# Patient Record
Sex: FEMALE | Race: OTHER | URBAN - METROPOLITAN AREA
[De-identification: names, ages, dates, MRNs, and addresses within clinical notes are randomized per-mention and may not be internally consistent; named-entity substitution may affect disease eponyms.]

---

## 2020-12-17 ENCOUNTER — EMERGENCY (EMERGENCY)
Facility: HOSPITAL | Age: 25
LOS: 1 days | Discharge: HOME | End: 2020-12-17
Attending: EMERGENCY MEDICINE | Admitting: EMERGENCY MEDICINE
Payer: COMMERCIAL

## 2020-12-17 VITALS
HEIGHT: 63 IN | WEIGHT: 130.07 LBS | TEMPERATURE: 98 F | OXYGEN SATURATION: 96 % | HEART RATE: 79 BPM | SYSTOLIC BLOOD PRESSURE: 113 MMHG | DIASTOLIC BLOOD PRESSURE: 79 MMHG | RESPIRATION RATE: 18 BRPM

## 2020-12-17 DIAGNOSIS — U07.1 COVID-19: ICD-10-CM

## 2020-12-17 DIAGNOSIS — R53.81 OTHER MALAISE: ICD-10-CM

## 2020-12-17 LAB
ALBUMIN SERPL ELPH-MCNC: 4.4 G/DL — SIGNIFICANT CHANGE UP (ref 3.3–5)
ALP SERPL-CCNC: 41 U/L — SIGNIFICANT CHANGE UP (ref 40–120)
ALT FLD-CCNC: 28 U/L — SIGNIFICANT CHANGE UP (ref 10–45)
ANION GAP SERPL CALC-SCNC: 12 MMOL/L — SIGNIFICANT CHANGE UP (ref 5–17)
APTT BLD: 43.3 SEC — HIGH (ref 27.5–35.5)
AST SERPL-CCNC: 47 U/L — HIGH (ref 10–40)
BASE EXCESS BLDV CALC-SCNC: -0.1 MMOL/L — SIGNIFICANT CHANGE UP
BASOPHILS # BLD AUTO: 0.01 K/UL — SIGNIFICANT CHANGE UP (ref 0–0.2)
BASOPHILS NFR BLD AUTO: 0.3 % — SIGNIFICANT CHANGE UP (ref 0–2)
BILIRUB SERPL-MCNC: 0.2 MG/DL — SIGNIFICANT CHANGE UP (ref 0.2–1.2)
BUN SERPL-MCNC: 12 MG/DL — SIGNIFICANT CHANGE UP (ref 7–23)
CA-I SERPL-SCNC: 1.13 MMOL/L — SIGNIFICANT CHANGE UP (ref 1.12–1.3)
CALCIUM SERPL-MCNC: 9 MG/DL — SIGNIFICANT CHANGE UP (ref 8.4–10.5)
CHLORIDE SERPL-SCNC: 102 MMOL/L — SIGNIFICANT CHANGE UP (ref 96–108)
CK SERPL-CCNC: 840 U/L — HIGH (ref 25–170)
CO2 SERPL-SCNC: 25 MMOL/L — SIGNIFICANT CHANGE UP (ref 22–31)
CREAT SERPL-MCNC: 0.86 MG/DL — SIGNIFICANT CHANGE UP (ref 0.5–1.3)
CRP SERPL-MCNC: 1.05 MG/DL — HIGH (ref 0–0.4)
D DIMER BLD IA.RAPID-MCNC: <150 NG/ML DDU — SIGNIFICANT CHANGE UP
EOSINOPHIL # BLD AUTO: 0.02 K/UL — SIGNIFICANT CHANGE UP (ref 0–0.5)
EOSINOPHIL NFR BLD AUTO: 0.5 % — SIGNIFICANT CHANGE UP (ref 0–6)
FERRITIN SERPL-MCNC: 116 NG/ML — SIGNIFICANT CHANGE UP (ref 15–150)
GAS PNL BLDV: 137 MMOL/L — LOW (ref 138–146)
GAS PNL BLDV: SIGNIFICANT CHANGE UP
GAS PNL BLDV: SIGNIFICANT CHANGE UP
GLUCOSE SERPL-MCNC: 93 MG/DL — SIGNIFICANT CHANGE UP (ref 70–99)
HCO3 BLDV-SCNC: 25 MMOL/L — SIGNIFICANT CHANGE UP (ref 20–27)
HCT VFR BLD CALC: 41.6 % — SIGNIFICANT CHANGE UP (ref 34.5–45)
HGB BLD-MCNC: 14 G/DL — SIGNIFICANT CHANGE UP (ref 11.5–15.5)
IMM GRANULOCYTES NFR BLD AUTO: 0.3 % — SIGNIFICANT CHANGE UP (ref 0–1.5)
INR BLD: 1.06 — SIGNIFICANT CHANGE UP (ref 0.88–1.16)
LACTATE SERPL-SCNC: 0.9 MMOL/L — SIGNIFICANT CHANGE UP (ref 0.5–2)
LYMPHOCYTES # BLD AUTO: 1.95 K/UL — SIGNIFICANT CHANGE UP (ref 1–3.3)
LYMPHOCYTES # BLD AUTO: 52.7 % — HIGH (ref 13–44)
MCHC RBC-ENTMCNC: 29.5 PG — SIGNIFICANT CHANGE UP (ref 27–34)
MCHC RBC-ENTMCNC: 33.7 GM/DL — SIGNIFICANT CHANGE UP (ref 32–36)
MCV RBC AUTO: 87.6 FL — SIGNIFICANT CHANGE UP (ref 80–100)
MONOCYTES # BLD AUTO: 0.46 K/UL — SIGNIFICANT CHANGE UP (ref 0–0.9)
MONOCYTES NFR BLD AUTO: 12.4 % — SIGNIFICANT CHANGE UP (ref 2–14)
NEUTROPHILS # BLD AUTO: 1.25 K/UL — LOW (ref 1.8–7.4)
NEUTROPHILS NFR BLD AUTO: 33.8 % — LOW (ref 43–77)
NRBC # BLD: 0 /100 WBCS — SIGNIFICANT CHANGE UP (ref 0–0)
PCO2 BLDV: 44 MMHG — SIGNIFICANT CHANGE UP (ref 41–51)
PH BLDV: 7.38 — SIGNIFICANT CHANGE UP (ref 7.32–7.43)
PLATELET # BLD AUTO: 268 K/UL — SIGNIFICANT CHANGE UP (ref 150–400)
PO2 BLDV: 32 MMHG — SIGNIFICANT CHANGE UP
POTASSIUM BLDV-SCNC: 3.9 MMOL/L — SIGNIFICANT CHANGE UP (ref 3.5–4.9)
POTASSIUM SERPL-MCNC: 3.9 MMOL/L — SIGNIFICANT CHANGE UP (ref 3.5–5.3)
POTASSIUM SERPL-SCNC: 3.9 MMOL/L — SIGNIFICANT CHANGE UP (ref 3.5–5.3)
PROCALCITONIN SERPL-MCNC: 0.05 NG/ML — SIGNIFICANT CHANGE UP (ref 0.02–0.1)
PROT SERPL-MCNC: 6.8 G/DL — SIGNIFICANT CHANGE UP (ref 6–8.3)
PROTHROM AB SERPL-ACNC: 12.7 SEC — SIGNIFICANT CHANGE UP (ref 10.6–13.6)
RBC # BLD: 4.75 M/UL — SIGNIFICANT CHANGE UP (ref 3.8–5.2)
RBC # FLD: 11.8 % — SIGNIFICANT CHANGE UP (ref 10.3–14.5)
SAO2 % BLDV: 59 % — SIGNIFICANT CHANGE UP
SODIUM SERPL-SCNC: 139 MMOL/L — SIGNIFICANT CHANGE UP (ref 135–145)
TROPONIN T SERPL-MCNC: <0.01 NG/ML — SIGNIFICANT CHANGE UP (ref 0–0.01)
WBC # BLD: 3.7 K/UL — LOW (ref 3.8–10.5)
WBC # FLD AUTO: 3.7 K/UL — LOW (ref 3.8–10.5)

## 2020-12-17 PROCEDURE — 71045 X-RAY EXAM CHEST 1 VIEW: CPT | Mod: 26

## 2020-12-17 PROCEDURE — 99285 EMERGENCY DEPT VISIT HI MDM: CPT

## 2020-12-17 PROCEDURE — 93010 ELECTROCARDIOGRAM REPORT: CPT

## 2020-12-17 PROCEDURE — 70450 CT HEAD/BRAIN W/O DYE: CPT | Mod: 26

## 2020-12-17 RX ORDER — SODIUM CHLORIDE 9 MG/ML
1000 INJECTION INTRAMUSCULAR; INTRAVENOUS; SUBCUTANEOUS ONCE
Refills: 0 | Status: COMPLETED | OUTPATIENT
Start: 2020-12-17 | End: 2020-12-17

## 2020-12-17 RX ORDER — ACETAMINOPHEN 500 MG
1000 TABLET ORAL ONCE
Refills: 0 | Status: COMPLETED | OUTPATIENT
Start: 2020-12-17 | End: 2020-12-17

## 2020-12-17 RX ORDER — KETOROLAC TROMETHAMINE 30 MG/ML
30 SYRINGE (ML) INJECTION ONCE
Refills: 0 | Status: DISCONTINUED | OUTPATIENT
Start: 2020-12-17 | End: 2020-12-17

## 2020-12-17 RX ADMIN — Medication 1000 MILLIGRAM(S): at 23:00

## 2020-12-17 RX ADMIN — Medication 1000 MILLIGRAM(S): at 23:48

## 2020-12-17 RX ADMIN — Medication 30 MILLIGRAM(S): at 23:48

## 2020-12-17 RX ADMIN — Medication 400 MILLIGRAM(S): at 22:53

## 2020-12-17 RX ADMIN — SODIUM CHLORIDE 1000 MILLILITER(S): 9 INJECTION INTRAMUSCULAR; INTRAVENOUS; SUBCUTANEOUS at 22:27

## 2020-12-17 RX ADMIN — Medication 30 MILLIGRAM(S): at 22:52

## 2020-12-17 RX ADMIN — SODIUM CHLORIDE 1000 MILLILITER(S): 9 INJECTION INTRAMUSCULAR; INTRAVENOUS; SUBCUTANEOUS at 23:00

## 2020-12-17 NOTE — ED ADULT NURSE NOTE - OBJECTIVE STATEMENT
26 yo female c/o back pain, subjective fever, and +covid. Pt. reports having migraine x 5 days and two days ago reports + covid test. Denies SOB, dizziness, n/v/d.

## 2020-12-17 NOTE — ED PROVIDER NOTE - PATIENT PORTAL LINK FT
You can access the FollowMyHealth Patient Portal offered by [facility] by registering at the following website: [email or web address]. By joining [employer]’s FollowMyHealth portal, you will also be able to view your health information using other applications (apps) compatible with our system.

## 2020-12-17 NOTE — ED ADULT NURSE NOTE - NSSEPSISSUSPECTED_ED_A_ED
Patient Instructions by Brad HayesJoann Patricio Manuel at 10/31/18 09:02 AM     Author:  Constantin Barcenas CMA Service:  (none) Author Type:  Certified Medical Assistant     Filed:  10/31/18 09:03 AM Encounter Date:  10/31/2018 Status:  Addendum     :  Spencer Hayes Ewelina Patricio Manuel (Certified Medical Assistant)            Next visit with Kobe Santizo is on 02/25/2019 at  9:45 AM in [address]    Dr Stacey Santos at the [facility] - Phone # is [phone]    Additional Educational Resources: For additional resources regarding your symptoms, diagnosis, or further health information, please visit the Health Resources section on [email or web address] or the Online Health Resources section in [employer].           Revision History        User Key Date/Time User Provider Type Action    > [N/A] 10/31/18 09:03 AM Constantin Barcenas Spencer Theodore Loopback Manuel Certified Medical Assistant Addend     [N/A] 10/31/18 09:02 AM Constantin Barcenas Spencer Patricio Manuel Certified Medical Assistant Sign No

## 2020-12-17 NOTE — ED PROVIDER NOTE - CLINICAL SUMMARY MEDICAL DECISION MAKING FREE TEXT BOX
26 y/o F pt with covid presents to ED with body aches, neck pain, and generalized malaise. Case discussed with Dr. Soto, neuro in ED to evaluate pt. Ordered CT head, CXR, labs, and will reassess.

## 2020-12-17 NOTE — ED PROVIDER NOTE - OBJECTIVE STATEMENT
26 y/o F pt with no pertinent PMHx and PSHx presents to ED c/o generalized malaise, headache, body aches, and neck pain x 2 days. Pt was positive for covid-19 2 days ago, and had 5 days of headache until then. Pt was seen by a family friend physician Dr. Barnhart who was concerned for possible meningitis and contacted Dr. Soto before sending pt to ED. Pt states she has been taking zinc and Vitamin C, and took Tylenol pm yesterday. Pt is ambulatory at baseline, well appearing in ED, and has no focal findings. Pt denies fevers or any other acute complaints.

## 2020-12-17 NOTE — CONSULT NOTE ADULT - ASSESSMENT
Assessment and Plan:  25y Female w/ PMH coming in with symptoms of generalized malaise, back and neck pain, chest congestion. Patient reports testing positive for COVID two days prior. Patient states neck pain and back pain have been ongoing for the last five days and symptoms have been waxing and waning. Symptoms most likely 2/2 to covid infection. Patient well appearing with full range of motion of neck, no Kernig or Brudzinki signs on examination.    Plan:  -No neuro intervention at this time  -Rest of care and disposition as per ED

## 2020-12-17 NOTE — ED CLERICAL - NS ED CLERK NOTE PRE-ARRIVAL INFORMATION; ADDITIONAL PRE-ARRIVAL INFORMATION
Covid +, sent by Dr. Samuels for concern for meningitis secondary to symptoms; Dr. Curry aware DR SAMUELS CALLING IN THE PATIENT

## 2020-12-17 NOTE — ED ADULT TRIAGE NOTE - ARRIVAL INFO ADDITIONAL COMMENTS
pt tested positive for covid 2 days ago and since has had headache, nasal congestion, neck pain and body aches.  sent by PMD who contacted neurology for a meningitis work up.

## 2020-12-17 NOTE — CONSULT NOTE ADULT - SUBJECTIVE AND OBJECTIVE BOX
*CONSULT NOTE*      HPI: 24 y/o F with no PMH presents to [facility] ED with complaints of general malaise, body aches, chest congestion, back and neck pain X five days. Patient reports testing COVID + two days prior, no known exposure but was on an airplane eight days ago. Patient reports she has neck stiffness and back stiffness as well as trouble walking up stairs. 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 Only medication use is OCP.       T(C): 36.9 (12-17-20 @ 19:53), Max: 36.9 (12-17-20 @ 19:53)  HR: 79 (12-17-20 @ 19:53) (79 - 79)  BP: 113/79 (12-17-20 @ 19:53) (113/79 - 113/79)  RR: 18 (12-17-20 @ 19:53) (18 - 18)  SpO2: 96% (12-17-20 @ 19:53) (96% - 96%)    PAST MEDICAL & SURGICAL HISTORY:      FAMILY HISTORY:      SOCIAL HISTORY:    ROS:   Constitutional: No fever, weight loss or fatigue  Eyes: No eye pain, visual disturbances, or discharge  ENMT:  No difficulty hearing, tinnitus, vertigo; No sinus or throat pain  Neck: No pain or stiffness  Respiratory: No cough, wheezing, chills or hemoptysis  Cardiovascular: No chest pain, palpitations, shortness of breath, dizziness or leg swelling  Gastrointestinal: No abdominal pain. No nausea, vomiting or hematemesis; No diarrhea or constipation. Nohematochezia.  Genitourinary: No dysuria, frequency, hematuria or incontinence  Neurological: As per HPI      MEDICATIONS  (STANDING):  acetaminophen  IVPB .. 1000 milliGRAM(s) IV Intermittent once  ketorolac   Injectable 30 milliGRAM(s) IV Push Once  sodium chloride 0.9% Bolus 1000 milliLiter(s) IV Bolus once    MEDICATIONS  (PRN):    Allergies    No Known Allergies    Intolerances      Vital Signs Last 24 Hrs  T(C): 36.9 (17 Dec 2020 19:53), Max: 36.9 (17 Dec 2020 19:53)  T(F): 98.5 (17 Dec 2020 19:53), Max: 98.5 (17 Dec 2020 19:53)  HR: 79 (17 Dec 2020 19:53) (79 - 79)  BP: 113/79 (17 Dec 2020 19:53) (113/79 - 113/79)  BP(mean): --  RR: 18 (17 Dec 2020 19:53) (18 - 18)  SpO2: 96% (17 Dec 2020 19:53) (96% - 96%)    Physical exam:  General: Well appearing female, no acute distress  Neck: Full ROM, no Kernig     Neurologic:  A&O X3 able to state name, location, month and current president   Speech is clear and coherent, no dysarthria or aphasia. Able to name object and function  Able to follow commands  -Cranial nerves:   II: Visual fields are full to confrontation.  III, IV, VI: Extraocular movements are intact without nystagmus. Pupils equally round and reactive to light  V:  Facial sensation V1-V3 equal and intact   VII: Face is symmetric with normal eye closure and smile  VIII: Hearing is bilaterally intact to finger rub  IX, X: Uvula is midline and soft palate rises symmetrically  XI: Head turning and shoulder shrug are intact.  XII: Tongue protrudes midline  Motor: Normal bulk and tone. No pronator drift. Strength bilateral upper extremity 5/5, bilateral lower extremities 5/5.  Sensation: Intact to light touch bilaterally. No neglect or extinction on double simultaneous testing.  Coordination: No dysmetria on finger-to-nose   Reflexes: Downgoing toes bilaterally   Gait: Narrow gait and steady        Fingerstick Blood Glucose: CAPILLARY BLOOD GLUCOSE        LABS:                    RADIOLOGY & ADDITIONAL STUDIES:      -----------------------------------------------------------------------------------------------------------------

## 2020-12-18 VITALS
SYSTOLIC BLOOD PRESSURE: 108 MMHG | OXYGEN SATURATION: 99 % | TEMPERATURE: 98 F | RESPIRATION RATE: 16 BRPM | DIASTOLIC BLOOD PRESSURE: 75 MMHG | HEART RATE: 64 BPM

## 2020-12-18 LAB
APPEARANCE UR: CLEAR — SIGNIFICANT CHANGE UP
BILIRUB UR-MCNC: NEGATIVE — SIGNIFICANT CHANGE UP
COLOR SPEC: YELLOW — SIGNIFICANT CHANGE UP
DIFF PNL FLD: NEGATIVE — SIGNIFICANT CHANGE UP
GLUCOSE UR QL: NEGATIVE — SIGNIFICANT CHANGE UP
KETONES UR-MCNC: NEGATIVE — SIGNIFICANT CHANGE UP
LEUKOCYTE ESTERASE UR-ACNC: NEGATIVE — SIGNIFICANT CHANGE UP
NITRITE UR-MCNC: NEGATIVE — SIGNIFICANT CHANGE UP
PH UR: 6.5 — SIGNIFICANT CHANGE UP (ref 5–8)
PROT UR-MCNC: NEGATIVE MG/DL — SIGNIFICANT CHANGE UP
SARS-COV-2 RNA SPEC QL NAA+PROBE: DETECTED
SP GR SPEC: 1.02 — SIGNIFICANT CHANGE UP (ref 1–1.03)
UROBILINOGEN FLD QL: 0.2 E.U./DL — SIGNIFICANT CHANGE UP

## 2020-12-18 PROCEDURE — 87086 URINE CULTURE/COLONY COUNT: CPT

## 2020-12-18 PROCEDURE — 84132 ASSAY OF SERUM POTASSIUM: CPT

## 2020-12-18 PROCEDURE — 84145 PROCALCITONIN (PCT): CPT

## 2020-12-18 PROCEDURE — 84295 ASSAY OF SERUM SODIUM: CPT

## 2020-12-18 PROCEDURE — 70450 CT HEAD/BRAIN W/O DYE: CPT

## 2020-12-18 PROCEDURE — 96374 THER/PROPH/DIAG INJ IV PUSH: CPT

## 2020-12-18 PROCEDURE — U0003: CPT

## 2020-12-18 PROCEDURE — 96375 TX/PRO/DX INJ NEW DRUG ADDON: CPT

## 2020-12-18 PROCEDURE — 81025 URINE PREGNANCY TEST: CPT

## 2020-12-18 PROCEDURE — 99284 EMERGENCY DEPT VISIT MOD MDM: CPT | Mod: 25

## 2020-12-18 PROCEDURE — 71045 X-RAY EXAM CHEST 1 VIEW: CPT

## 2020-12-18 PROCEDURE — 86140 C-REACTIVE PROTEIN: CPT

## 2020-12-18 PROCEDURE — 85610 PROTHROMBIN TIME: CPT

## 2020-12-18 PROCEDURE — 82330 ASSAY OF CALCIUM: CPT

## 2020-12-18 PROCEDURE — 85730 THROMBOPLASTIN TIME PARTIAL: CPT

## 2020-12-18 PROCEDURE — 83605 ASSAY OF LACTIC ACID: CPT

## 2020-12-18 PROCEDURE — 87040 BLOOD CULTURE FOR BACTERIA: CPT

## 2020-12-18 PROCEDURE — 85379 FIBRIN DEGRADATION QUANT: CPT

## 2020-12-18 PROCEDURE — 96361 HYDRATE IV INFUSION ADD-ON: CPT

## 2020-12-18 PROCEDURE — 82803 BLOOD GASES ANY COMBINATION: CPT

## 2020-12-18 PROCEDURE — 80053 COMPREHEN METABOLIC PANEL: CPT

## 2020-12-18 PROCEDURE — 82728 ASSAY OF FERRITIN: CPT

## 2020-12-18 PROCEDURE — 84484 ASSAY OF TROPONIN QUANT: CPT

## 2020-12-18 PROCEDURE — 85025 COMPLETE CBC W/AUTO DIFF WBC: CPT

## 2020-12-18 PROCEDURE — 93005 ELECTROCARDIOGRAM TRACING: CPT

## 2020-12-18 PROCEDURE — 36415 COLL VENOUS BLD VENIPUNCTURE: CPT

## 2020-12-18 PROCEDURE — 82550 ASSAY OF CK (CPK): CPT

## 2020-12-18 PROCEDURE — 81003 URINALYSIS AUTO W/O SCOPE: CPT

## 2020-12-18 RX ADMIN — SODIUM CHLORIDE 1000 MILLILITER(S): 9 INJECTION INTRAMUSCULAR; INTRAVENOUS; SUBCUTANEOUS at 00:14

## 2020-12-20 LAB
CULTURE RESULTS: SIGNIFICANT CHANGE UP
SPECIMEN SOURCE: SIGNIFICANT CHANGE UP

## 2020-12-23 LAB
CULTURE RESULTS: SIGNIFICANT CHANGE UP
CULTURE RESULTS: SIGNIFICANT CHANGE UP
SPECIMEN SOURCE: SIGNIFICANT CHANGE UP
SPECIMEN SOURCE: SIGNIFICANT CHANGE UP

## 2024-12-08 NOTE — ED ADULT TRIAGE NOTE - ISOLATION INDICATION DROPLET CONTACT
[place], an 32 y.o. female presents to the ED      Chief Complaint   Patient presents with    Shortness of Breath     +SOB. +Chest tightness. +nasal congestion. Recently seen for same symptoms. Completed antibiotic treatment.     Review of patient's allergies indicates:   Allergen Reactions    Imitrex [sumatriptan succinate] Other (See Comments)     Numbness in hands    Topamax [topiramate] Other (See Comments)     Numbness and tingling to fingers      Past Medical History:   Diagnosis Date    Bipolar 1 disorder     Bipolar depression     Bronchitis     Pneumonia        LOC: The patient is awake, alert, aware of environment with an appropriate affect. Oriented x4, speaking appropriately  APPEARANCE: Pt resting comfortably, in no acute distress, pt is clean and well groomed, clothing properly fastened  SKIN:The skin is warm and dry, color consistent with ethnicity, patient has normal skin turgor and moist mucus membranes  RESPIRATORY:Airway is open and patent, respirations are spontaneous, patient has a normal effort and rate, no accessory muscle use noted. +SOB (green phlegm with blood) +nasal congestion  CARDIAC: Normal rate and rhythm, no peripheral edema noted, capillary refill < 3 seconds, bilateral radial pulses 2+. +CHEST TIGHTNESS   ABDOMEN: Soft, non tender, non distended. Bowel sounds present x 4 quadrants.   NEUROLOGIC: PERRLA, facial expression is symmetrical, patient moving all extremities spontaneously, normal sensation in all extremities when touched with a finger.  Follows all commands appropriately  MUSCULOSKELETAL: Patient moving all extremities spontaneously, no obvious swelling or deformities noted.       
Other Specify